# Patient Record
Sex: FEMALE | URBAN - METROPOLITAN AREA
[De-identification: names, ages, dates, MRNs, and addresses within clinical notes are randomized per-mention and may not be internally consistent; named-entity substitution may affect disease eponyms.]

---

## 2022-11-09 ENCOUNTER — OFFICE VISIT (OUTPATIENT)
Dept: PULMONOLOGY | Age: 62
End: 2022-11-09

## 2022-11-09 VITALS — WEIGHT: 197 LBS | RESPIRATION RATE: 14 BRPM | HEIGHT: 64 IN | BODY MASS INDEX: 33.63 KG/M2

## 2022-11-09 DIAGNOSIS — R05.3 CHRONIC COUGH: Primary | ICD-10-CM

## 2022-11-11 ENCOUNTER — OFFICE VISIT (OUTPATIENT)
Dept: PULMONOLOGY | Age: 62
End: 2022-11-11

## 2022-11-11 VITALS
DIASTOLIC BLOOD PRESSURE: 79 MMHG | WEIGHT: 197.4 LBS | RESPIRATION RATE: 18 BRPM | HEIGHT: 64 IN | OXYGEN SATURATION: 98 % | TEMPERATURE: 97.9 F | SYSTOLIC BLOOD PRESSURE: 138 MMHG | BODY MASS INDEX: 33.7 KG/M2 | HEART RATE: 80 BPM

## 2022-11-11 DIAGNOSIS — R05.9 COUGH, UNSPECIFIED TYPE: Primary | ICD-10-CM

## 2022-11-11 DIAGNOSIS — R06.09 DYSPNEA ON EXERTION: ICD-10-CM

## 2022-11-11 PROCEDURE — 94729 DIFFUSING CAPACITY: CPT | Performed by: INTERNAL MEDICINE

## 2022-11-11 PROCEDURE — 94727 GAS DIL/WSHOT DETER LNG VOL: CPT | Performed by: INTERNAL MEDICINE

## 2022-11-11 PROCEDURE — 99203 OFFICE O/P NEW LOW 30 MIN: CPT | Performed by: INTERNAL MEDICINE

## 2022-11-11 RX ORDER — LEVOTHYROXINE SODIUM 112 UG/1
TABLET ORAL
COMMUNITY

## 2022-11-11 RX ORDER — ALBUTEROL SULFATE 90 UG/1
1 AEROSOL, METERED RESPIRATORY (INHALATION)
Qty: 18 G | Refills: 5 | Status: SHIPPED | OUTPATIENT
Start: 2022-11-11

## 2022-11-11 RX ORDER — LOSARTAN POTASSIUM 100 MG/1
100 TABLET ORAL DAILY
COMMUNITY

## 2022-11-11 NOTE — PROGRESS NOTES
[facility]                                                       Pulmonary, Critical Care, and Sleep Medicine      Pulmonary Office Initial Consultation. Name: Harris Ordonez     : 1960     Date: 2022        Subjective:   Chief complaint: Cough, chronic  Patient is a 60 y.o. female with a PMHx of Breast cancer (Dx , Lt breast; s/p complete mastectomy with reconstruction; s/p Chemo, no RT done), Hypothyroidism (post-ablative) due to Grave's disease, HTN, and Tobacco abuse. HPI (22): Today in clinic, she states that she is here for evaluation of her cough. She reports a history of cough which started in Oct, 2018 and since then, she has been having cough on and off. Notes that cough has worsened this year and she came to the clinic because of the urging of her . Cough is productive of white phlegm and associated with a feeling of chest congestion. No hemoptysis. No diurnal pattern to cough. She denies nausea or emesis associated with her cough. She also complains of intermittent dyspnea with going up a flight of stairs and wheezing which is worse at night. Notes slight change in her voice as well. She has never been on an inhaler in the past. Denies sinus drainage, congestion or seasonal allergies. No prior lung disease or Asthma. No family members with Asthma. Admits to intermittent acid reflux as well. Denies history of lung cancer in self or family. Tobacco/Substance/Vape/Hookah: former smoker; smoked 1 ppd x5 years; quit 40 years ago. 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   Travel: none  Pets: hypoallergenic dog - got it in 2018  TB exposure/testing: no known exposures   VTE/DVT history: none   service: none  Hobbies: spending time with family  Autoimmune disease: Grave's disease - ablation with radioactive iodine. Influenza vaccination: declined  [id number]-95 (+) testing: (+) in Aug,2022 -> severe fatigue, notes change in memory since then and decreased taste. COVID-19 vaccination: s/p Pfizer x2 doses    Sleep - admits to snoring and dry mouth in the morning. No witnessed apneas. Following with Cardiology -- Dr. Jones.    Past Medical History:   Diagnosis Date    Breast cancer ([facility] [address]     Graves disease     Hypertension      Past Surgical History:   Procedure Laterality Date    HX HYSTERECTOMY      HX MASTECTOMY      Complete     Social History     Socioeconomic History    Marital status:    Tobacco Use    Smoking status: Former     Packs/day: 1.00     Years: 10.00     Pack years: 10.00     Types: Cigarettes     Quit date:      Years since quittin.8     Passive exposure: Never    Smokeless tobacco: Never   Substance and Sexual Activity    Alcohol use: Yes     Alcohol/week: 1.0 standard drink     Types: 1 Glasses of wine per week     Comment: occ    Drug use: Never    Sexual activity: Yes     Partners: Male     History reviewed. No pertinent family history.     Not on File    Review of Systems:  HEENT: No epistaxis, no nasal drainage, no difficulty in swallowing, no redness in eyes  Respiratory: as above  Cardiovascular: no chest pain, no palpitations, no chronic leg edema, no syncope  Gastrointestinal: no abd pain, no vomiting, no diarrhea, no bleeding symptoms  Genitourinary: No urinary symptoms or hematuria  Integument/breast: No ulcers or rashes  Musculoskeletal: Neg  Neurological: No focal weakness, no seizures, no headaches  Behvioral/Psych: No anxiety, no depression  Constitutional: No fever, no chills, no weight loss, no night sweats     Objective:   Visit Vitals  /79 (BP 1 Location: Right upper arm, BP Patient Position: Sitting, BP Cuff Size: Adult)   Pulse 80   Temp 97.9 °F (36.6 °C) (Temporal)   Resp 18   Ht 5' 4\" (1.626 m)   Wt 89.5 kg (197 lb 6.4 oz)   LMP  (Exact Date)   SpO2 98%   BMI 33.88 kg/m²        Physical Exam:   General: comfortable, no acute distress  HEENT: pupils reactive, sclera anicteric, EOM intact, moist mucus membranes. No thrush. +Swollen b/l inferior turbinates. Neck: No adenopathy or thyroid swelling, no lymphadenopathy or JVD, supple  CVS: S1S2 no murmurs  RS: Lungs CTA. No wheezes or rhonchi. No tachypnea or accessory muscle use. Abd: soft, non tender, no hepatosplenomegaly, BS normal  Neuro: non focal, awake, alert  Extrm: no leg edema, clubbing or cyanosis  Skin: no rash on exposed skin    Data review:   Pertinent labs: CBC, BMP, LFT's - not available for review  Serologies (ILD, MATT): n/a  IgE: n/a  Peripheral Eos: n/a  Allergy panel: n/a  Alpha-1 antitrypsin: n/a  Phenotype: n/a  ACE level: n/a    PFT:  Date_____FEV1______FVC___FEV1/FVC___BDFEV1___BDFVC___pstBDratio__FEV1/SVC  11/2022. ...2.24(90%). ....2. 97(92%). .. ..75%. .....2.24(91%). ....2.9(90%). .. ..77%. ................n/a    Six Minute Walk Test: n/a    Echocardiogram  Date: 07/2008    Impression:     Normal global left ventricular systolic function. Ejection fraction is 60%. Normal pulmonary artery pressure by TR jet velocity. No previous report for comparison. Estimated right ventricular systolic pressure is 97WEOL. Imaging:  I have personally reviewed the patients radiographs and have reviewed the reports:  XR Results (most recent):  No results found for this or any previous visit. CT Results (most recent):  No results found for this or any previous visit. There is no problem list on file for this patient. IMPRESSION:   Cough, chronic: etiology unclear. Suspect may be due to chronic bronchitis vs NT-TB/bronchiectasis vs ILD/pulmonary fibrosis given prior autoimmune disease. No prior CXR available for review. Spirometry without obstructive or restrictive defect. ? 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  H/o COVIV-19 viral pneumonia: Aug, 2022  H/o Tobacco abuse  Comorbid conditions: as above. RECOMMENDATIONS:   Obtain DLCO/Lung volumes  Start on Albuterol rescue inhaler as needed for shortness of breath  Labwork - Allergen panel, Quant-TB  Vaccination: Recommend all age-appropriate immunizations  Follow up - 3 months & prn.       Health maintenance screens deferred to Primary care provider.      Nabil Saldana,   11/11/2022  Pulmonary, Critical Care Medicine  [facility]

## 2022-11-11 NOTE — LETTER
11/11/2022    Patient: Harris Ordonez   YOB: 1960   Date of Visit: 11/11/2022     Sonja Avina NP  [phone]  Via Fax: [phone]    Dear Sonja Avina NP,      Thank you for referring Ms. Harris Ordonez to [address] [place] [address] for evaluation. My notes for this consultation are attached. If you have questions, please do not hesitate to call me. I look forward to following your patient along with you.       Sincerely,    Darrin Yan, DO

## 2022-11-11 NOTE — PROGRESS NOTES
Long Almeida presents today for   Chief Complaint   Patient presents with    Cough     Chronic         Is someone accompanying this pt? No    Is the patient using any DME equipment during OV? No    -DME Company NA    Depression Screening:  3 most recent PHQ Screens 11/11/2022   Little interest or pleasure in doing things Not at all   Feeling down, depressed, irritable, or hopeless Not at all   Total Score PHQ 2 0       Learning Assessment:  No flowsheet data found. Abuse Screening:  No flowsheet data found. Fall Risk  No flowsheet data found. Coordination of Care:  1. Have you been to the ER, urgent care clinic since your last visit? Hospitalized since your last visit? No    2. Have you seen or consulted any other health care providers outside of the [address] [place] [address] since your last visit? Include any pap smears or colon screening.  No

## 2022-11-11 NOTE — PATIENT INSTRUCTIONS
What is the plan?  -complete X-ray of chest   -Complete lung function test  -complete labwork  -Start Albuterol rescue inhaler as needed for shortness of breath.

## 2022-11-15 PROCEDURE — 94060 EVALUATION OF WHEEZING: CPT | Performed by: INTERNAL MEDICINE
